# Patient Record
Sex: MALE | Race: OTHER | HISPANIC OR LATINO | URBAN - METROPOLITAN AREA
[De-identification: names, ages, dates, MRNs, and addresses within clinical notes are randomized per-mention and may not be internally consistent; named-entity substitution may affect disease eponyms.]

---

## 2024-06-21 ENCOUNTER — EMERGENCY (EMERGENCY)
Facility: HOSPITAL | Age: 54
LOS: 1 days | Discharge: HOME | End: 2024-06-21
Admitting: EMERGENCY MEDICINE
Payer: SELF-PAY

## 2024-06-21 VITALS
DIASTOLIC BLOOD PRESSURE: 99 MMHG | SYSTOLIC BLOOD PRESSURE: 158 MMHG | RESPIRATION RATE: 18 BRPM | WEIGHT: 160.06 LBS | TEMPERATURE: 98 F | HEART RATE: 69 BPM | OXYGEN SATURATION: 99 %

## 2024-06-21 PROCEDURE — 99284 EMERGENCY DEPT VISIT MOD MDM: CPT

## 2024-06-21 PROCEDURE — 73030 X-RAY EXAM OF SHOULDER: CPT

## 2024-06-21 PROCEDURE — 73030 X-RAY EXAM OF SHOULDER: CPT | Mod: 26,RT

## 2024-06-21 PROCEDURE — 99283 EMERGENCY DEPT VISIT LOW MDM: CPT | Mod: 25

## 2024-06-21 RX ORDER — IBUPROFEN 200 MG
1 TABLET ORAL
Qty: 20 | Refills: 0
Start: 2024-06-21 | End: 2024-06-25

## 2024-06-21 RX ORDER — LIDOCAINE 4 G/100G
1 CREAM TOPICAL ONCE
Refills: 0 | Status: COMPLETED | OUTPATIENT
Start: 2024-06-21 | End: 2024-06-21

## 2024-06-21 RX ORDER — IBUPROFEN 200 MG
600 TABLET ORAL ONCE
Refills: 0 | Status: COMPLETED | OUTPATIENT
Start: 2024-06-21 | End: 2024-06-21

## 2024-06-21 RX ORDER — METHOCARBAMOL 500 MG/1
2 TABLET, FILM COATED ORAL
Qty: 18 | Refills: 0
Start: 2024-06-21 | End: 2024-06-23

## 2024-06-21 RX ORDER — METHOCARBAMOL 500 MG/1
1000 TABLET, FILM COATED ORAL ONCE
Refills: 0 | Status: COMPLETED | OUTPATIENT
Start: 2024-06-21 | End: 2024-06-21

## 2024-06-21 RX ADMIN — Medication 600 MILLIGRAM(S): at 15:43

## 2024-06-21 RX ADMIN — METHOCARBAMOL 1000 MILLIGRAM(S): 500 TABLET, FILM COATED ORAL at 15:42

## 2024-06-21 RX ADMIN — LIDOCAINE 1 PATCH: 4 CREAM TOPICAL at 15:41
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
## 2024-06-21 NOTE — ED ADULT NURSE NOTE - NSFALLUNIVINTERV_ED_ALL_ED
Bed/Stretcher in lowest position, wheels locked, appropriate side rails in place/Call bell, personal items and telephone in reach/Instruct patient to call for assistance before getting out of bed/chair/stretcher/Non-slip footwear applied when patient is off stretcher/[place] to call system/Physically safe environment - no spills, clutter or unnecessary equipment/Purposeful proactive rounding/Room/bathroom lighting operational, light cord in reach

## 2024-06-21 NOTE — ED PROVIDER NOTE - PHYSICAL EXAMINATION
CONSTITUTIONAL: Well-appearing;  in no apparent distress.   HEAD: Normocephalic; atraumatic.   EYES: PERRL; EOM intact; conjunctiva and sclera clear  ENT: normal nose; no rhinorrhea; normal pharynx with no erythema or lesions.   NECK: Supple; non-tender;   CARDIOVASCULAR: rrr,  RESPIRATORY: Breathing easily;   MSK: R shoulder + ttp, FROM. +Lumbar paraspinal ttp, no midline tenderness   EXT: No cyanosis or edema; N/V intact  SKIN: Normal for age and race; warm; dry; good turgor; no apparent lesions or rash.

## 2024-06-21 NOTE — ED PROVIDER NOTE - NSFOLLOWUPINSTRUCTIONS_ED_ALL_ED_FT
Distensión lumbar  Lumbar Strain  Macey distensión lumbar, a veces llamada distensión de la parte baja de la espalda, es un estiramiento o un desgarro en un músculo o en los tendones en la parte inferior de la espalda (columna lumbar). Los tendones son cuerdas aba de tejido que unen los músculos al hueso. Briseyda tipo de lesión ocurre cuando los músculos o los tendones se desgarran o se estiran más allá de dolan límite.    Las distensiones lumbares pueden ser de leves a graves. Las distensiones leves pueden involucrar el estiramiento de un músculo o un tendón sin desgarrarlo. Estas pueden curarse en 1 o 2 semanas. Las distensiones más graves involucran el desgarro de fibras o tendones musculares. Estas causarán más dolor y pueden demorar entre 6 y 8 semanas en curarse.    ¿Cuáles son las causas?  Esta afección puede ser causada por lo siguiente:  Traumatismo, debido, por ejemplo, a macey caída o a un golpe en el cuerpo.  Torsión o hiperdistensión de la espalda. Adjuntas puede ser el resultado de realizar actividades que requieren mucha energía, ashanti levantar objetos pesados.  ¿Qué incrementa el riesgo?  Macey distensión lumbar es más frecuente en:  Atletas.  Personas con obesidad.  Personas que hacen movimientos repetitivos de levantar, inclinarse u otros movimientos que involucran la espalda.  ¿Cuáles son los signos o síntomas?  Los síntomas de esta afección pueden incluir los siguientes:  Dolor alexia o sordo en la parte inferior de la espalda que no desaparece. El dolor se puede extender hacia las nalgas.  Rigidez o amplitud de movimientos limitada.  Contracciones musculares súbitas (espasmos).  ¿Cómo se diagnostica?  Esta afección se puede diagnosticar en función de lo siguiente:  Los síntomas.  Luis antecedentes médicos.  Un examen físico.  Pruebas de diagnóstico por imágenes, por ejemplo:  Radiografías.  Resonancia magnética (RM).  ¿Cómo se trata?  El tratamiento de esta afección puede incluir lo siguiente:  Reposo.  Aplicar calor y frío en la sonali afectada.  Medicamentos de venta koby para aliviar el dolor y la inflamación, ashanti antiinflamatorios no esteroideos (WILLIAM).  Medicamentos recetados para el dolor o para relajar los músculos. Estos pueden necesitarse mark un tiempo breve.  Ejercicios de fisioterapia para mejorar el movimiento y la fuerza.  Siga estas indicaciones en dolan casa:  Control del dolor, la rigidez y la hinchazón    Bag of ice on a towel on the skin.  A heating pad for use on the affected area.  Si se lo indican, aplique hielo en la sonali lesionada mark las primeras 24 horas después de producida la lesión.  Ponga el hielo en macey bolsa plástica.  Coloque macey toalla entre la piel y la bolsa.  Aplique el hielo mark 20 minutos, 2 o 3 veces por día.  Si se lo indican, aplique calor en la sonali afectada con la frecuencia que le haya dicho el médico. Use la martin de calor que el médico le recomiende, ashanti macey compresa de calor húmedo o macey almohadilla térmica.  Coloque macey toalla entre la piel y la martin de calor.  Aplique calor mark 20 a 30 minutos.  Remove the heat if your skin turns bright red. This is especially important if you are unable to feel pain, heat, or cold. You have a greater risk of getting burned.  Si la piel se le pone de color barron brillante, retire el hielo o el calor de inmediato para evitar daños en la piel. El riesgo de daño es mayor si no puede sentir dolor, calor o frío.  Actividad    Descanse y retome luis actividades normales ashanti se lo haya indicado el médico. Pregúntele al médico qué actividades son seguras para usted.  Mina ejercicio ashanti se lo haya indicado el médico.  Indicaciones generales    Use los medicamentos de venta koby y los recetados solamente ashanti se lo haya indicado el médico.  Pregúntele al médico si el medicamento recetado:  Requiere que evite conducir o usar maquinaria.  Puede causarle estreñimiento. Es posible que tenga que jaylon estas medidas para prevenir o tratar el estreñimiento:  Beber suficiente líquido ashanti para mantener la orina de color amarillo pálido.  Usar medicamentos recetados o de venta koby.  Consumir alimentos ricos en fibra, ashanti frijoles, cereales integrales, y frutas y verduras frescas.  Limitar el consumo de alimentos ricos en grasa y azúcares procesados, ashanti los alimentos fritos o dulces.  No consuma ningún producto que contenga nicotina o tabaco. Estos productos incluyen cigarrillos, tabaco para mascar y aparatos de vapeo, ashanti los cigarrillos electrónicos. Si necesita ayuda para dejar de consumir estos productos, consulte al médico.  ¿Cómo se previene?  A person showing how to lift a heavy object. The correct way shows the person's knees bent.  Para prevenir macey lesión futura en la parte baja de la espalda:  Siempre mina un precalentamiento adecuado antes de la actividad física o de practicar deportes.  Relájese y elongue después de hacer actividad física.  Practique deportes y levante objetos pesados de forma correcta. Flexione las rodillas antes de levantar objetos pesados.  Adopte macey buena postura mientras esté sentado y de pie.  Manténgase en buen estado físico y con un peso saludable.  Mina por lo menos 150 minutos de ejercicios de intensidad moderada cada semana, ashanti caminar a paso ligero o hacer gimnasia acuática.  Mina ejercicios de fuerza al menos 2 veces por semana.  Comuníquese con un médico si:  El dolor de espalda no mejora después de varias semanas de tratamiento.  Luis síntomas empeoran.  Tiene fiebre.  Solicite ayuda de inmediato si:  El dolor de espalda es muy intenso.  Nota que no puede pararse o caminar.  Siente dolor en las piernas.  Siente debilidad en las nalgas o en las piernas.  Tiene problemas para controlar la micción o las deposiciones.  Tiene micción frecuente, dolorosa o con glenn.  Esta información no tiene ashanti fin reemplazar el consejo del médico. Asegúrese de hacerle al médico cualquier pregunta que tenga.
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
## 2024-06-21 NOTE — ED PROVIDER NOTE - PATIENT PORTAL LINK FT
You can access the FollowMyHealth Patient Portal offered by [facility] by registering at the following website: [email or web address]. By joining [employer]’s FollowMyHealth portal, you will also be able to view your health information using other applications (apps) compatible with our system.
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
## 2024-06-25 DIAGNOSIS — M25.511 PAIN IN RIGHT SHOULDER: ICD-10-CM

## 2024-06-25 DIAGNOSIS — I10 ESSENTIAL (PRIMARY) HYPERTENSION: ICD-10-CM

## 2024-06-25 DIAGNOSIS — S39.012A STRAIN OF MUSCLE, FASCIA AND TENDON OF LOWER BACK, INITIAL ENCOUNTER: ICD-10-CM
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
